# Patient Record
Sex: FEMALE | Race: WHITE
[De-identification: names, ages, dates, MRNs, and addresses within clinical notes are randomized per-mention and may not be internally consistent; named-entity substitution may affect disease eponyms.]

---

## 2019-01-10 ENCOUNTER — HOSPITAL ENCOUNTER (OUTPATIENT)
Dept: HOSPITAL 74 - JASU-SURG | Age: 59
Discharge: HOME | End: 2019-01-10
Attending: SURGERY
Payer: MEDICARE

## 2019-01-10 VITALS — BODY MASS INDEX: 26.2 KG/M2

## 2019-01-10 VITALS — TEMPERATURE: 97.6 F | SYSTOLIC BLOOD PRESSURE: 125 MMHG | DIASTOLIC BLOOD PRESSURE: 68 MMHG | HEART RATE: 68 BPM

## 2019-01-10 DIAGNOSIS — I83.812: Primary | ICD-10-CM

## 2019-01-10 PROCEDURE — 06BY0ZZ EXCISION OF LOWER VEIN, OPEN APPROACH: ICD-10-PCS | Performed by: SURGERY

## 2019-01-10 NOTE — HP
Admitting History and Physical





- Admission


Chief Complaint: Pt here for phlebectomy of varicose veins left lower ext


Limitations to Obtaining History: No Limitations





- Smoking History


Smoking history: Never smoked


Have you smoked in the past 12 months: No





- Alcohol/Substance Use


Hx Alcohol Use: No





Home Medications





- Allergies


Allergies/Adverse Reactions: 


 Allergies











Allergy/AdvReac Type Severity Reaction Status Date / Time


 


No Known Allergies Allergy   Verified 01/10/19 12:07














- Home Medications


Home Medications: 


Ambulatory Orders





Ergocalciferol (Vitamin D2) [Vitamin D2] 25,000 unit PO WEEKLY 06/27/18 


Lisinopril/Hydrochlorothiazide [Lisinopril-Hctz 10-12.5 mg Tab] 12.5 mg PO HS 06 /27/18 


Metformin HCl 1,000 mg PO BID 06/27/18 


Cyanocobalamin Vit B-12 Inj. [Redisol] 1,000 mcg IM MONTHLY 12/26/18 











Review of Systems





- Review of Systems


Constitutional: reports: No Symptoms


Eyes: reports: No Symptoms


HENT: reports: No Symptoms


Neck: reports: No Symptoms


Cardiovascular: reports: No Symptoms


Respiratory: reports: No Symptoms


Gastrointestinal: reports: No Symptoms


Genitourinary: reports: No Symptoms


Breasts: reports: No Symptoms Reported


Musculoskeletal: reports: No Symptoms


Integumentary: reports: No Symptoms


Neurological: reports: No Symptoms


Endocrine: reports: No Symptoms


Hematology/Lymphatic: reports: No Symptoms


Psychiatric: reports: No Symptoms





Physical Examination


Vital Signs: 


 Vital Signs











Temperature  97.5 F L  01/10/19 11:51


 


Pulse Rate  64   01/10/19 11:51


 


Respiratory Rate  18   01/10/19 11:51


 


Blood Pressure  121/74   01/10/19 11:51


 


O2 Sat by Pulse Oximetry (%)  99   01/10/19 11:52











Constitutional: Yes: Well Nourished, No Distress, Calm


Eyes: Yes: WNL, Conjunctiva Clear, EOM Intact


HENT: Yes: WNL, Atraumatic, Normocephalic


Neck: Yes: WNL, Supple, Trachea Midline


Cardiovascular: Yes: WNL, Regular Rate and Rhythm


Respiratory: Yes: WNL, Regular, CTA Bilaterally


Gastrointestinal: Yes: WNL, Normal Bowel Sounds


Musculoskeletal: Yes: WNL


Extremities: Yes: WNL, Other (left lower ext varicose veins)


Edema: No


Integumentary: Yes: WNL


Neurological: Yes: WNL, Alert, Oriented


...Motor Strength: WNL


Psychiatric: Yes: WNL





Problem List





- Problems


(1) Varicose veins of left leg with edema


Assessment/Plan: 


For phlebectomy of left lower ext varicose veins 


Code(s): I83.892 - VARICOSE VEINS OF L LOW EXTREM WITH OTHER COMPLICATIONS

## 2019-01-12 NOTE — OP
DATE OF OPERATION:  01/10/2019

 

PREOPERATIVE DIAGNOSIS:  Left lower extremity varicose veins.

 

POSTOPERATIVE DIAGNOSIS:  Left lower extremity varicose veins.

 

PROCEDURE:  Stab phlebectomy, left lower extremity varicose veins, a total of 5

stabs.

 

SURGEON:  Shan Medley DO

 

ANESTHESIA:  Fractional.

 

BLOOD LOSS:  30 mL.

 

The patient is a 58-year-old female that has left lower extremity varicose veins that

need phlebectomy.  Patient came into ambulatory surgery.  Patient was consented for

the procedure.  Understanding all risks, benefits and alternatives was then taken to

the operating room.

 

Once in the operative suite was placed on the operating table in the supine manner.  

Prior to going into the operating room we had the patient stand up in the holding

area and using a skin marker we marked the varicose veins that needed to be removed. 

Once patient was laid down on the operating room table the area of the left lower

extremity was prepped and draped in a sterile surgical manner.  Patient then received

general anesthesia.  We then went ahead and used a No. 11 blade and we were able to

make a stab over the varicose veins and using a vein hook  the vein was removed and

using mosquito clamps and _____ clamps we were able to remove the veins and the veins

were sent to Pathology.  In this manner we made a total of 5 stab incisions and the

varicose veins were removed.  Pressure was held over each stab and once there was no

more bleeding the area was wet and dried and we went ahead and placed 5 Steri-Strips.

 We then placed 4 x 4's, Kerlix and Ace bandage.  Patient tolerated the procedure

with no complication.  Patient transferred to PACU in stable condition.  Total blood

loss 50 mL.

 

 

SHAN MEDLEY DO

 

NP/[id number]

DD: 01/12/2019 11:10

DT: 01/12/2019 11:44

Job #:  [id number]

## 2019-01-14 NOTE — PATH
Surgical Pathology Report



Patient Name:  OTTO GURROLA

Accession #:  

[facility]. Rec. #:  [id number]                                                        

   /Age/Gender:  1960 (Age: 58) / F

Account:  [id number]                                                          

             Location: [facility] SURGICAL

Taken:  2019

Received:  2019

Reported:  2019

Physicians:  Shan Kirkland

  



Specimen(s) Received

 VEINS FROM LEFT LOWER EXTREMITIES 





Clinical History

Varicose veins left lower extremity







Final Diagnosis

VEINS, LOWER EXTREMITY, LEFT, PHLEBECTOMY:

VEINS WITH MILD INTIMAL HYPERTROPHY.





***Electronically Signed***

Kary Bernal M.D.





Gross Description

Received in formalin labeled "veins left lower extremity," are 3 tan soft tissue

fragments ranging from 0.3-0.6 cm in greatest dimension, possibly consistent

with portions of vein. The specimen is submitted in toto in one cassette.

DL/2019